# Patient Record
Sex: MALE | Race: WHITE | Employment: OTHER | URBAN - METROPOLITAN AREA
[De-identification: names, ages, dates, MRNs, and addresses within clinical notes are randomized per-mention and may not be internally consistent; named-entity substitution may affect disease eponyms.]

---

## 2017-11-01 ENCOUNTER — HOSPITAL ENCOUNTER (OUTPATIENT)
Dept: PREADMISSION TESTING | Age: 72
Discharge: HOME | End: 2017-11-01
Payer: MEDICARE

## 2017-11-01 DIAGNOSIS — Z01.811 PRE-OP CHEST EXAM: ICD-10-CM

## 2017-11-01 LAB
ANION GAP SERPL CALC-SCNC: 9 MMOL/L (ref 3–18)
APTT PPP: 28.5 SEC (ref 23–36.4)
BACTERIA SPEC CULT: NORMAL
BUN SERPL-MCNC: 20 MG/DL (ref 7–18)
BUN/CREAT SERPL: 24 (ref 12–20)
CALCIUM SERPL-MCNC: 9.1 MG/DL (ref 8.5–10.1)
CHLORIDE SERPL-SCNC: 106 MMOL/L (ref 100–108)
CO2 SERPL-SCNC: 28 MMOL/L (ref 21–32)
CREAT SERPL-MCNC: 0.84 MG/DL (ref 0.6–1.3)
ERYTHROCYTE [DISTWIDTH] IN BLOOD BY AUTOMATED COUNT: 15.7 % (ref 11.6–14.5)
GLUCOSE SERPL-MCNC: 98 MG/DL (ref 74–99)
HCT VFR BLD AUTO: 40.1 % (ref 36–48)
HGB BLD-MCNC: 13.2 G/DL (ref 13–16)
INR PPP: 1.1 (ref 0.8–1.2)
MCH RBC QN AUTO: 28 PG (ref 24–34)
MCHC RBC AUTO-ENTMCNC: 32.9 G/DL (ref 31–37)
MCV RBC AUTO: 85 FL (ref 74–97)
PLATELET # BLD AUTO: 156 K/UL (ref 135–420)
PMV BLD AUTO: 10.5 FL (ref 9.2–11.8)
POTASSIUM SERPL-SCNC: 4 MMOL/L (ref 3.5–5.5)
PROTHROMBIN TIME: 13.6 SEC (ref 11.5–15.2)
RBC # BLD AUTO: 4.72 M/UL (ref 4.7–5.5)
SERVICE CMNT-IMP: NORMAL
SODIUM SERPL-SCNC: 143 MMOL/L (ref 136–145)
WBC # BLD AUTO: 6.6 K/UL (ref 4.6–13.2)

## 2017-11-01 PROCEDURE — 87641 MR-STAPH DNA AMP PROBE: CPT | Performed by: ORTHOPAEDIC SURGERY

## 2017-11-01 PROCEDURE — 85730 THROMBOPLASTIN TIME PARTIAL: CPT | Performed by: ORTHOPAEDIC SURGERY

## 2017-11-01 PROCEDURE — 80048 BASIC METABOLIC PNL TOTAL CA: CPT | Performed by: ORTHOPAEDIC SURGERY

## 2017-11-01 PROCEDURE — 85610 PROTHROMBIN TIME: CPT | Performed by: ORTHOPAEDIC SURGERY

## 2017-11-01 PROCEDURE — 93005 ELECTROCARDIOGRAM TRACING: CPT

## 2017-11-01 PROCEDURE — 85027 COMPLETE CBC AUTOMATED: CPT | Performed by: ORTHOPAEDIC SURGERY

## 2017-11-01 PROCEDURE — 36415 COLL VENOUS BLD VENIPUNCTURE: CPT | Performed by: ORTHOPAEDIC SURGERY

## 2017-11-02 LAB
ATRIAL RATE: 59 BPM
CALCULATED P AXIS, ECG09: 66 DEGREES
CALCULATED R AXIS, ECG10: 61 DEGREES
CALCULATED T AXIS, ECG11: 71 DEGREES
DIAGNOSIS, 93000: NORMAL
P-R INTERVAL, ECG05: 166 MS
Q-T INTERVAL, ECG07: 422 MS
QRS DURATION, ECG06: 102 MS
QTC CALCULATION (BEZET), ECG08: 417 MS
VENTRICULAR RATE, ECG03: 59 BPM

## 2017-11-19 PROBLEM — M54.10 RADICULOPATHY OF LEG: Status: ACTIVE | Noted: 2017-11-19

## 2017-11-19 PROBLEM — M48.061 LUMBAR SPINAL STENOSIS: Status: ACTIVE | Noted: 2017-11-19

## 2017-11-19 PROBLEM — M47.816 LUMBAR SPONDYLOSIS: Status: ACTIVE | Noted: 2017-11-19

## 2017-11-20 ENCOUNTER — HOSPITAL ENCOUNTER (OUTPATIENT)
Age: 72
Setting detail: OUTPATIENT SURGERY
Discharge: HOME | End: 2017-11-20
Attending: ORTHOPAEDIC SURGERY | Admitting: ORTHOPAEDIC SURGERY
Payer: MEDICARE

## 2017-11-20 ENCOUNTER — ANESTHESIA EVENT (OUTPATIENT)
Dept: SURGERY | Age: 72
End: 2017-11-20
Payer: MEDICARE

## 2017-11-20 ENCOUNTER — ANESTHESIA (OUTPATIENT)
Dept: SURGERY | Age: 72
End: 2017-11-20
Payer: MEDICARE

## 2017-11-20 ENCOUNTER — APPOINTMENT (OUTPATIENT)
Dept: GENERAL RADIOLOGY | Age: 72
End: 2017-11-20
Attending: ORTHOPAEDIC SURGERY
Payer: MEDICARE

## 2017-11-20 VITALS
TEMPERATURE: 97.9 F | HEART RATE: 74 BPM | DIASTOLIC BLOOD PRESSURE: 69 MMHG | SYSTOLIC BLOOD PRESSURE: 115 MMHG | HEIGHT: 69 IN | RESPIRATION RATE: 18 BRPM | BODY MASS INDEX: 21.2 KG/M2 | WEIGHT: 143.13 LBS | OXYGEN SATURATION: 98 %

## 2017-11-20 PROCEDURE — 76060000033 HC ANESTHESIA 1 TO 1.5 HR: Performed by: ORTHOPAEDIC SURGERY

## 2017-11-20 PROCEDURE — 77030003666 HC NDL SPINAL BD -A: Performed by: ORTHOPAEDIC SURGERY

## 2017-11-20 PROCEDURE — 74011250636 HC RX REV CODE- 250/636

## 2017-11-20 PROCEDURE — 74011250636 HC RX REV CODE- 250/636: Performed by: ANESTHESIOLOGY

## 2017-11-20 PROCEDURE — 77030032490 HC SLV COMPR SCD KNE COVD -B: Performed by: ORTHOPAEDIC SURGERY

## 2017-11-20 PROCEDURE — 77030008683 HC TU ET CUF COVD -A: Performed by: ANESTHESIOLOGY

## 2017-11-20 PROCEDURE — 77030006643: Performed by: ANESTHESIOLOGY

## 2017-11-20 PROCEDURE — 76010000149 HC OR TIME 1 TO 1.5 HR: Performed by: ORTHOPAEDIC SURGERY

## 2017-11-20 PROCEDURE — 77030033138 HC SUT PGA STRATFX J&J -B: Performed by: ORTHOPAEDIC SURGERY

## 2017-11-20 PROCEDURE — 77030018836 HC SOL IRR NACL ICUM -A: Performed by: ORTHOPAEDIC SURGERY

## 2017-11-20 PROCEDURE — 77030011640 HC PAD GRND REM COVD -A: Performed by: ORTHOPAEDIC SURGERY

## 2017-11-20 PROCEDURE — 77030004435 HC BUR RND STRY -C: Performed by: ORTHOPAEDIC SURGERY

## 2017-11-20 PROCEDURE — 74011000250 HC RX REV CODE- 250

## 2017-11-20 PROCEDURE — 77030013708 HC HNDPC SUC IRR PULS STRY –B: Performed by: ORTHOPAEDIC SURGERY

## 2017-11-20 PROCEDURE — 74011250636 HC RX REV CODE- 250/636: Performed by: ORTHOPAEDIC SURGERY

## 2017-11-20 PROCEDURE — 77030008477 HC STYL SATN SLP COVD -A: Performed by: ANESTHESIOLOGY

## 2017-11-20 PROCEDURE — 77030003029 HC SUT VCRL J&J -B: Performed by: ORTHOPAEDIC SURGERY

## 2017-11-20 PROCEDURE — 76210000021 HC REC RM PH II 0.5 TO 1 HR: Performed by: ORTHOPAEDIC SURGERY

## 2017-11-20 PROCEDURE — 76210000016 HC OR PH I REC 1 TO 1.5 HR: Performed by: ORTHOPAEDIC SURGERY

## 2017-11-20 PROCEDURE — 77030013079 HC BLNKT BAIR HGGR 3M -A: Performed by: ANESTHESIOLOGY

## 2017-11-20 PROCEDURE — 77030020010 HC FCPS BPLR DISP INLC -E: Performed by: ORTHOPAEDIC SURGERY

## 2017-11-20 PROCEDURE — 77030020782 HC GWN BAIR PAWS FLX 3M -B: Performed by: ORTHOPAEDIC SURGERY

## 2017-11-20 PROCEDURE — 77030037875 HC DRSG MEPILEX <16IN BORD MOLN -A: Performed by: ORTHOPAEDIC SURGERY

## 2017-11-20 PROCEDURE — 77030034479 HC ADH SKN CLSR PRINEO J&J -B: Performed by: ORTHOPAEDIC SURGERY

## 2017-11-20 PROCEDURE — 74011000250 HC RX REV CODE- 250: Performed by: ORTHOPAEDIC SURGERY

## 2017-11-20 PROCEDURE — 77030020255 HC SOL INJ LR 1000ML BG: Performed by: ORTHOPAEDIC SURGERY

## 2017-11-20 RX ORDER — INSULIN LISPRO 100 [IU]/ML
INJECTION, SOLUTION INTRAVENOUS; SUBCUTANEOUS ONCE
Status: DISCONTINUED | OUTPATIENT
Start: 2017-11-20 | End: 2017-11-20 | Stop reason: HOSPADM

## 2017-11-20 RX ORDER — SODIUM CHLORIDE, SODIUM LACTATE, POTASSIUM CHLORIDE, CALCIUM CHLORIDE 600; 310; 30; 20 MG/100ML; MG/100ML; MG/100ML; MG/100ML
125 INJECTION, SOLUTION INTRAVENOUS CONTINUOUS
Status: DISCONTINUED | OUTPATIENT
Start: 2017-11-20 | End: 2017-11-20 | Stop reason: HOSPADM

## 2017-11-20 RX ORDER — HYDROMORPHONE HYDROCHLORIDE 2 MG/ML
INJECTION, SOLUTION INTRAMUSCULAR; INTRAVENOUS; SUBCUTANEOUS AS NEEDED
Status: DISCONTINUED | OUTPATIENT
Start: 2017-11-20 | End: 2017-11-20 | Stop reason: HOSPADM

## 2017-11-20 RX ORDER — ACETAMINOPHEN 10 MG/ML
1000 INJECTION, SOLUTION INTRAVENOUS ONCE
Status: DISCONTINUED | OUTPATIENT
Start: 2017-11-20 | End: 2017-11-20 | Stop reason: HOSPADM

## 2017-11-20 RX ORDER — HYDROMORPHONE HYDROCHLORIDE 1 MG/ML
0.5 INJECTION, SOLUTION INTRAMUSCULAR; INTRAVENOUS; SUBCUTANEOUS
Status: DISCONTINUED | OUTPATIENT
Start: 2017-11-20 | End: 2017-11-20 | Stop reason: HOSPADM

## 2017-11-20 RX ORDER — SODIUM CHLORIDE 0.9 % (FLUSH) 0.9 %
5-10 SYRINGE (ML) INJECTION AS NEEDED
Status: DISCONTINUED | OUTPATIENT
Start: 2017-11-20 | End: 2017-11-20 | Stop reason: HOSPADM

## 2017-11-20 RX ORDER — ACETAMINOPHEN AND CODEINE PHOSPHATE 300; 30 MG/1; MG/1
2 TABLET ORAL
COMMUNITY
End: 2017-11-20

## 2017-11-20 RX ORDER — CIPROFLOXACIN 750 MG/1
750 TABLET, FILM COATED ORAL EVERY 12 HOURS
Qty: 14 TAB | Refills: 0 | Status: SHIPPED | OUTPATIENT
Start: 2017-11-20 | End: 2017-11-27

## 2017-11-20 RX ORDER — ONDANSETRON 2 MG/ML
INJECTION INTRAMUSCULAR; INTRAVENOUS AS NEEDED
Status: DISCONTINUED | OUTPATIENT
Start: 2017-11-20 | End: 2017-11-20 | Stop reason: HOSPADM

## 2017-11-20 RX ORDER — MIDAZOLAM HYDROCHLORIDE 1 MG/ML
INJECTION, SOLUTION INTRAMUSCULAR; INTRAVENOUS AS NEEDED
Status: DISCONTINUED | OUTPATIENT
Start: 2017-11-20 | End: 2017-11-20 | Stop reason: HOSPADM

## 2017-11-20 RX ORDER — FENTANYL CITRATE 50 UG/ML
INJECTION, SOLUTION INTRAMUSCULAR; INTRAVENOUS AS NEEDED
Status: DISCONTINUED | OUTPATIENT
Start: 2017-11-20 | End: 2017-11-20 | Stop reason: HOSPADM

## 2017-11-20 RX ORDER — FLUMAZENIL 0.1 MG/ML
0.2 INJECTION INTRAVENOUS
Status: DISCONTINUED | OUTPATIENT
Start: 2017-11-20 | End: 2017-11-20 | Stop reason: HOSPADM

## 2017-11-20 RX ORDER — NEOSTIGMINE METHYLSULFATE 1 MG/ML
INJECTION INTRAVENOUS AS NEEDED
Status: DISCONTINUED | OUTPATIENT
Start: 2017-11-20 | End: 2017-11-20 | Stop reason: HOSPADM

## 2017-11-20 RX ORDER — CYCLOBENZAPRINE HCL 10 MG
5 TABLET ORAL
Qty: 30 TAB | Refills: 1 | Status: SHIPPED | OUTPATIENT
Start: 2017-11-20 | End: 2020-01-14

## 2017-11-20 RX ORDER — MAGNESIUM SULFATE 100 %
4 CRYSTALS MISCELLANEOUS AS NEEDED
Status: DISCONTINUED | OUTPATIENT
Start: 2017-11-20 | End: 2017-11-20 | Stop reason: HOSPADM

## 2017-11-20 RX ORDER — ROCURONIUM BROMIDE 10 MG/ML
INJECTION, SOLUTION INTRAVENOUS AS NEEDED
Status: DISCONTINUED | OUTPATIENT
Start: 2017-11-20 | End: 2017-11-20 | Stop reason: HOSPADM

## 2017-11-20 RX ORDER — OXYCODONE AND ACETAMINOPHEN 5; 325 MG/1; MG/1
1 TABLET ORAL
Qty: 40 TAB | Refills: 0 | Status: SHIPPED | OUTPATIENT
Start: 2017-11-20 | End: 2020-01-14

## 2017-11-20 RX ORDER — DEXAMETHASONE SODIUM PHOSPHATE 4 MG/ML
INJECTION, SOLUTION INTRA-ARTICULAR; INTRALESIONAL; INTRAMUSCULAR; INTRAVENOUS; SOFT TISSUE AS NEEDED
Status: DISCONTINUED | OUTPATIENT
Start: 2017-11-20 | End: 2017-11-20 | Stop reason: HOSPADM

## 2017-11-20 RX ORDER — DEXTROSE 50 % IN WATER (D50W) INTRAVENOUS SYRINGE
25-50 AS NEEDED
Status: DISCONTINUED | OUTPATIENT
Start: 2017-11-20 | End: 2017-11-20 | Stop reason: HOSPADM

## 2017-11-20 RX ORDER — GLYCOPYRROLATE 0.2 MG/ML
INJECTION INTRAMUSCULAR; INTRAVENOUS AS NEEDED
Status: DISCONTINUED | OUTPATIENT
Start: 2017-11-20 | End: 2017-11-20 | Stop reason: HOSPADM

## 2017-11-20 RX ORDER — CEFAZOLIN SODIUM 2 G/50ML
2 SOLUTION INTRAVENOUS ONCE
Status: COMPLETED | OUTPATIENT
Start: 2017-11-20 | End: 2017-11-20

## 2017-11-20 RX ORDER — LIDOCAINE HYDROCHLORIDE 20 MG/ML
INJECTION, SOLUTION EPIDURAL; INFILTRATION; INTRACAUDAL; PERINEURAL AS NEEDED
Status: DISCONTINUED | OUTPATIENT
Start: 2017-11-20 | End: 2017-11-20 | Stop reason: HOSPADM

## 2017-11-20 RX ORDER — EPHEDRINE SULFATE/0.9% NACL/PF 25 MG/5 ML
SYRINGE (ML) INTRAVENOUS AS NEEDED
Status: DISCONTINUED | OUTPATIENT
Start: 2017-11-20 | End: 2017-11-20 | Stop reason: HOSPADM

## 2017-11-20 RX ORDER — PROPOFOL 10 MG/ML
INJECTION, EMULSION INTRAVENOUS AS NEEDED
Status: DISCONTINUED | OUTPATIENT
Start: 2017-11-20 | End: 2017-11-20 | Stop reason: HOSPADM

## 2017-11-20 RX ORDER — NALOXONE HYDROCHLORIDE 0.4 MG/ML
0.1 INJECTION, SOLUTION INTRAMUSCULAR; INTRAVENOUS; SUBCUTANEOUS AS NEEDED
Status: DISCONTINUED | OUTPATIENT
Start: 2017-11-20 | End: 2017-11-20 | Stop reason: HOSPADM

## 2017-11-20 RX ORDER — SODIUM CHLORIDE, SODIUM LACTATE, POTASSIUM CHLORIDE, CALCIUM CHLORIDE 600; 310; 30; 20 MG/100ML; MG/100ML; MG/100ML; MG/100ML
1000 INJECTION, SOLUTION INTRAVENOUS CONTINUOUS
Status: DISCONTINUED | OUTPATIENT
Start: 2017-11-20 | End: 2017-11-20 | Stop reason: HOSPADM

## 2017-11-20 RX ADMIN — ONDANSETRON 4 MG: 2 INJECTION INTRAMUSCULAR; INTRAVENOUS at 12:06

## 2017-11-20 RX ADMIN — Medication 10 MG: at 12:33

## 2017-11-20 RX ADMIN — GLYCOPYRROLATE 0.4 MG: 0.2 INJECTION INTRAMUSCULAR; INTRAVENOUS at 13:01

## 2017-11-20 RX ADMIN — NEOSTIGMINE METHYLSULFATE 3 MG: 1 INJECTION INTRAVENOUS at 13:01

## 2017-11-20 RX ADMIN — ROCURONIUM BROMIDE 30 MG: 10 INJECTION, SOLUTION INTRAVENOUS at 11:56

## 2017-11-20 RX ADMIN — ROCURONIUM BROMIDE 20 MG: 10 INJECTION, SOLUTION INTRAVENOUS at 12:16

## 2017-11-20 RX ADMIN — LIDOCAINE HYDROCHLORIDE 60 MG: 20 INJECTION, SOLUTION EPIDURAL; INFILTRATION; INTRACAUDAL; PERINEURAL at 11:56

## 2017-11-20 RX ADMIN — SODIUM CHLORIDE, SODIUM LACTATE, POTASSIUM CHLORIDE, AND CALCIUM CHLORIDE 125 ML/HR: 600; 310; 30; 20 INJECTION, SOLUTION INTRAVENOUS at 08:44

## 2017-11-20 RX ADMIN — MIDAZOLAM HYDROCHLORIDE 2 MG: 1 INJECTION, SOLUTION INTRAMUSCULAR; INTRAVENOUS at 11:52

## 2017-11-20 RX ADMIN — HYDROMORPHONE HYDROCHLORIDE 0.5 MG: 2 INJECTION, SOLUTION INTRAMUSCULAR; INTRAVENOUS; SUBCUTANEOUS at 12:50

## 2017-11-20 RX ADMIN — SODIUM CHLORIDE, SODIUM LACTATE, POTASSIUM CHLORIDE, AND CALCIUM CHLORIDE: 600; 310; 30; 20 INJECTION, SOLUTION INTRAVENOUS at 13:05

## 2017-11-20 RX ADMIN — ACETAMINOPHEN 1000 MG: 10 INJECTION, SOLUTION INTRAVENOUS at 13:58

## 2017-11-20 RX ADMIN — DEXAMETHASONE SODIUM PHOSPHATE 4 MG: 4 INJECTION, SOLUTION INTRA-ARTICULAR; INTRALESIONAL; INTRAMUSCULAR; INTRAVENOUS; SOFT TISSUE at 12:06

## 2017-11-20 RX ADMIN — FENTANYL CITRATE 100 MCG: 50 INJECTION, SOLUTION INTRAMUSCULAR; INTRAVENOUS at 11:52

## 2017-11-20 RX ADMIN — CEFAZOLIN SODIUM 2 G: 2 SOLUTION INTRAVENOUS at 12:06

## 2017-11-20 RX ADMIN — PROPOFOL 80 MG: 10 INJECTION, EMULSION INTRAVENOUS at 12:16

## 2017-11-20 RX ADMIN — SODIUM CHLORIDE, SODIUM LACTATE, POTASSIUM CHLORIDE, AND CALCIUM CHLORIDE: 600; 310; 30; 20 INJECTION, SOLUTION INTRAVENOUS at 12:17

## 2017-11-20 RX ADMIN — PROPOFOL 120 MG: 10 INJECTION, EMULSION INTRAVENOUS at 11:56

## 2017-11-20 RX ADMIN — LIDOCAINE HYDROCHLORIDE 40 MG: 20 INJECTION, SOLUTION EPIDURAL; INFILTRATION; INTRACAUDAL; PERINEURAL at 12:15

## 2017-11-20 RX ADMIN — HYDROMORPHONE HYDROCHLORIDE 0.5 MG: 2 INJECTION, SOLUTION INTRAMUSCULAR; INTRAVENOUS; SUBCUTANEOUS at 13:08

## 2017-11-20 NOTE — ANESTHESIA POSTPROCEDURE EVALUATION
Post-Anesthesia Evaluation & Assessment    Visit Vitals    /60    Pulse 65    Temp 36.8 °C (98.2 °F)    Resp 12    Ht 5' 9\" (1.753 m)    Wt 64.9 kg (143 lb 2 oz)    SpO2 95%    BMI 21.14 kg/m2       No untreated/active PONV    Post-operative hydration adequate. Adequate post-operative analgesia per PACU discharge criteria    Mental status & level of consciousness: alert and oriented x 3    Respiratory status: patent unassisted airway     No apparent anesthetic complications requiring additional post-anesthetic care    Patient has met all discharge requirements.             Raymon Zuniga MD

## 2017-11-20 NOTE — INTERVAL H&P NOTE
H&P Update:  Juan Diego [place] was seen and examined. History and physical has been reviewed. The patient has been examined. There have been no significant clinical changes since the completion of the originally dated History and Physical.  Patient identified by surgeon; surgical site was confirmed by patient and surgeon.     Signed By: Demetrio Hernandez MD     November 20, 2017 7:33 AM

## 2017-11-20 NOTE — ANESTHESIA PREPROCEDURE EVALUATION
Anesthetic History   No history of anesthetic complications            Review of Systems / Medical History  Patient summary reviewed, nursing notes reviewed and pertinent labs reviewed    Pulmonary  Within defined limits                 Neuro/Psych         Neuromuscular disease     Cardiovascular    Hypertension: well controlled              Exercise tolerance: >4 METS     GI/Hepatic/Renal  Within defined limits              Endo/Other  Within defined limits           Other Findings            Physical Exam    Airway  Mallampati: II  TM Distance: 4 - 6 cm  Neck ROM: normal range of motion   Mouth opening: Normal     Cardiovascular    Rhythm: regular  Rate: normal         Dental    Dentition: Caps/crowns     Pulmonary  Breath sounds clear to auscultation               Abdominal  GI exam deferred       Other Findings            Anesthetic Plan    ASA: 3  Anesthesia type: general          Induction: Intravenous  Anesthetic plan and risks discussed with: Patient

## 2017-11-20 NOTE — PERIOP NOTES
AVS med list reviewed and verified - no duplicates with meds - common med side effects handout to pt - discharge instructions completed - opportunity for questions

## 2017-11-20 NOTE — H&P
History and Physical        Patient: Jennifer Dunaway               Sex: male          DOA: (Not on file)         YOB: 1945      Age:  81 y.o.        LOS:  LOS: 0 days        HPI:     Jennifer Dunaway is a 81 y.o. male who has a history of back and lower extremity pain. Their pain is typically across the lower back and travels into the left lower extremity down the lateral, posterior aspect of the leg. He has numbness/tingling in the same distribution of their pain. He denies weakness in the bilateral lower extremity. Their pain is worse with ambulation and better at rest. He has failed conservative care including anti-inflammatories, analgesics, physical therapy and injections. Their pain affects their activities of daily living and would like to move forward with surgical intervention. Past Medical History:   Diagnosis Date    Arthritis     Cancer ([facility] 1999    melanoma    Hypertension     Parkinson disease ([facility]        Past Surgical History:   Procedure Laterality Date    HX CORNEAL TRANSPLANT Bilateral     HX OTHER SURGICAL  1999    Exc melanoma on back    HX TONSILLECTOMY         No family history on file. Social History     Social History    Marital status:      Spouse name: N/A    Number of children: N/A    Years of education: N/A     Social History Main Topics    Smoking status: Never Smoker    Smokeless tobacco: Never Used    Alcohol use Yes      Comment: 1 -2 beers or glass wine per year    Drug use: No    Sexual activity: No     Other Topics Concern    Not on file     Social History Narrative    No narrative on file       Prior to Admission medications    Medication Sig Start Date End Date Taking? Authorizing Provider   amLODIPine (NORVASC) 10 mg tablet Take 10 mg by mouth daily. Historical Provider   doxycycline (MONODOX) 100 mg capsule Take 100 mg by mouth nightly.     Historical Provider   carbidopa-levodopa ER (SINEMET CR)  mg per tablet Take 1.5 Tabs by mouth four (4) times daily. Historical Provider   Pramipexole (MIRAPEX ER) 1.5 mg Tb24 Take  by mouth daily. Historical Provider   FOLIC ACID/MULTIVIT-MIN/LUTEIN (CENTRUM SILVER PO) Take 1 Tab by mouth daily. Historical Provider   ascorbic acid, vitamin C, (VITAMIN C) 500 mg tablet Take 1,000 mg by mouth daily. Historical Provider   CALCIUM CITRATE/VITAMIN D3 (CALCIUM CITRATE + D PO) Take 1 Tab by mouth daily. Historical Provider   docusate sodium (DOK) 250 mg capsule Take 250 mg by mouth daily. Historical Provider   oxyCODONE-acetaminophen (PERCOCET 10)  mg per tablet Take 1 Tab by mouth every four (4) hours as needed for Pain. Historical Provider   acetaminophen (TYLENOL EXTRA STRENGTH) 500 mg tablet Take 1,000 mg by mouth every six (6) hours as needed for Pain. Historical Provider       No Known Allergies    Review of Systems  A comprehensive review of systems was negative except for that written in the History of Present Illness. Physical Exam:      There were no vitals taken for this visit. Physical Exam:  General: Alert and Oriented X 3  Lungs: Clear to ausculation bilaterally  Cardiovascular: Regular Rate and Rhythm, without murmur  Abdomen: Soft, nontender with positive bowel sounds in all four quadrants  Gential/Rectal: deferred  Musculoskeletal: The paitent has full range of motion of the lumbar spine in flexion, extension and side bending bilaterally. The patient has pain with flexion or extension. There is not pain with internal and external rotation of the bilaterally hip. The patient is tender across the left paralumbar, right paralumbar muscles. The patient is neurologically intact in the lower extremities. There is a Negative straight leg raise. His pulses are 2+. Calves are nontender. Radiographic evaluation show Lumbar DDD at L4-S1      Labs Reviewed: All lab results for the last 24 hours reviewed.     Assessment/Plan     Principal Problem:    Lumbar spinal stenosis (11/19/2017)    Active Problems:    Radiculopathy of leg (11/19/2017)      Lumbar spondylosis (11/19/2017)        We are going to move forward with a left decompressive lumbar hemilaminectomy at L4-S1. The risks vs the benefits have been discussed with the patient. They will follow-up with us in the hospital 10 days post-operatively and call with any and all concerns.

## 2017-11-20 NOTE — IP AVS SNAPSHOT
Jeffrey Esteves 
 
 
 [address] [address] 
[phone] Patient: Chandana Choi MRN: [id number] About your hospitalization You were admitted on:  November 20, 2017 You last received care in the:  [facility] PACU You were discharged on:  November 20, 2017 Why you were hospitalized Your primary diagnosis was:  Lumbar Spinal Stenosis Your diagnoses also included:  Radiculopathy Of Leg, Lumbar Spondylosis Things You Need To Do (next 8 weeks) Follow up with Sumanth Powell MD  
  
Phone:  [phone] Where:  [address] [facility] [address] [facility], [place] [address], [address] Follow up with Gonzalo Garrison MD in 10 day(s) Phone:  [phone] Where:  [address], , Orthopedic and [address] [address], [facility] [address] Discharge Orders None A check boy indicates which time of day the medication should be taken. My Medications STOP taking these medications TYLENOL-CODEINE #3 300-30 mg per tablet Generic drug:  acetaminophen-codeine TAKE these medications as instructed Instructions Each Dose to Equal  
 Morning Noon Evening Bedtime CALCIUM CITRATE + D PO Your last dose was: Your next dose is: Take 1 Tab by mouth daily. 1 Tab  
    
   
   
   
  
 carbidopa-levodopa ER  mg per tablet Commonly known as:  SINEMET CR Your last dose was: Your next dose is: Take 1.5 Tabs by mouth four (4) times daily. 1.5 Tab CENTRUM SILVER PO Your last dose was: Your next dose is: Take 1 Tab by mouth daily. 1 Tab  
    
   
   
   
  
 ciprofloxacin HCl 750 mg tablet Commonly known as:  CIPRO Your last dose was: Your next dose is: Take 1 Tab by mouth every twelve (12) hours for 7 days.   
 750 mg  
    
   
   
   
  
 cyclobenzaprine 10 mg tablet Commonly known as:  FLEXERIL Your last dose was: Your next dose is: Take 0.5 Tabs by mouth three (3) times daily as needed for Muscle Spasm(s). 5 mg  
    
   
   
   
  
 docusate sodium 250 mg capsule Commonly known as:  [address] Your last dose was: Your next dose is: Take 250 mg by mouth daily. 250 mg  
    
   
   
   
  
 doxycycline 100 mg capsule Commonly known as:  Simone Villa Your last dose was: Your next dose is: Take 100 mg by mouth nightly. 100 mg MIRAPEX ER 1.5 mg Tb24 Generic drug:  Pramipexole Your last dose was: Your next dose is: Take  by mouth daily. NORVASC 10 mg tablet Generic drug:  amLODIPine Your last dose was: Your next dose is: Take 10 mg by mouth daily. 10 mg  
    
   
   
   
  
 oxyCODONE-acetaminophen 5-325 mg per tablet Commonly known as:  PERCOCET Your last dose was: Your next dose is: Take 1 Tab by mouth every six (6) hours as needed for Pain. Max Daily Amount: 4 Tabs. 1 Tab TYLENOL EXTRA STRENGTH 500 mg tablet Generic drug:  acetaminophen Your last dose was: Your next dose is: Take 1,000 mg by mouth every six (6) hours as needed for Pain. 1000 mg  
    
   
   
   
  
 VITAMIN C 500 mg tablet Generic drug:  ascorbic acid (vitamin C) Your last dose was: Your next dose is: Take 1,000 mg by mouth daily. 1000 mg Where to Get Your Medications Information on where to get these meds will be given to you by the nurse or doctor. ! Ask your nurse or doctor about these medications  
  ciprofloxacin HCl 750 mg tablet  
 cyclobenzaprine 10 mg tablet  
 oxyCODONE-acetaminophen 5-325 mg per tablet Discharge Instructions [address] Francisco Abreu Instructions Lumbar Laminectomy and Diskectomy ACTIVITIES : 
*The first week after surgery 1. You may be up and walking about the house. 2.  Activities around the house, such as washing dishes, fixing light meals, and your own personal care are fine. 3.  Avoid strenuous activities, such as vacuuming, lifting laundry or grocery bags. 4.  Do not lift anything heavier than 1 gallon of milk (or about 5-8 pounds). *Week 2 and beyond 1. You may gradually increase your activities, but still avoid heavy lifting, pushing/pulling. 2.  Walking is the best way to rebuild strength and stamina. Start SLOWLY and gradually increase the distance a little every week. 3.  Walk at a pace that avoids fatigue or severe pain. Do not try to walk several blocks the first day! As you increase the distance, you may feel tired. If so, stop and rest.  
4.  You should be able to walk several blocks by your first clinic visit. 5.  Follow-up with Dr. Saud Peter in 10-14 days. BATHING and INCISION CARE: 
1. The incision may be tender to touch or feel numb: this is normal.  
2.  Keep the incision clean and dry no showering until your follow-up appointment. The incision will be closed with sutures under the skin and the skin will be glued. 3.  Do not apply any lotions, ointments or oils on the incision. 4.  If you notice any excessive swelling, redness, or persistent drainage around the incision, notify the office immediately. DRIVIN. You should not drive until after your follow-up appointment. 2.  You can be in a vehicle for short distances, but if you travel any long distance, please stop about every 30 minutes and walk/stretch. 3.  You should NEVER drive while taking narcotic medication. RETURN TO WORK : 
1. The decision to return to work will be determined on an individual basis. 2.  Many people who have a strenuous job (construction, heavy labor, etc) may need to be off work for up to 4 weeks. 3.  If you need a work note, please let us know as soon as possible, and not the same day you are planning to return to work. NUTRITION : 
1.  Good nutrition is an essential part of healing. 2.  You should eat a balanced diet each day, including fruits, vegetables, dairy products and protein. 3.  Remember to drink plenty of water. 4.  If you have not had a bowel movement within 3 days of surgery, you will need to use a laxative or suppository that can be obtained over-the-counter at your local pharmacy. MEDICATIONS - 
1. You may resume the medications you were taking before surgery, with the general exception of (or DO NOT TAKE) non-steroidal anti-inflammatory medications, such as Motrin, Aleve, Advil Naprosyn, Ibuprofen or aspirin for 5 days after surgery. 2.  You will receive a prescription for pain medication at discharge from the hospital. The pain medication works best if taken before the pain becomes severe. 3.  To reduce stomach upset, always take the medication with food. 4.  Begin to wean yourself off the pain medication during the second week after discharge. 5.  If you need a refill, please call the office during working hours at least 2 days before your prescription runs out. Do not wait until your bottle is empty to call for a refill. 6.  DO NOT drive if you are taking narcotic pain medications. HOME HEALTH CARE: 
1.   A home health care service has been set-up for you to help assist you once you leave the hospital. 
2.  They will contact you either before you leave the hospital or within 24 hours once you have been discharged home. 3. A nurse will assist you with your dressing changes and a Physical Therapist with help you with your therapy needs. CALL THE OFFICE: 
?  If you have severe pain unrelieved by the medications, new numbness or tingling in your legs; 
? If you have a fever of 101.0°F or greater;  
? If you notice excessive swelling, redness, or persistent drainage from the incision or IV site; [facility] is [phone] from 8:00am to 5:00pm Monday through Friday. After 5:00pm, on weekends, or holidays, please leave a message with our answering service and the doctor on-call will get back to you shortly. DISCHARGE SUMMARY from Nurse PATIENT INSTRUCTIONS: 
 
 
F-face looks uneven A-arms unable to move or move unevenly S-speech slurred or non-existent T-time-call 911 as soon as signs and symptoms begin-DO NOT go Back to bed or wait to see if you get better-TIME IS BRAIN. Warning Signs of HEART ATTACK Call 911 if you have these symptoms: 
? Chest discomfort. Most heart attacks involve discomfort in the center of the chest that lasts more than a few minutes, or that goes away and comes back. It can feel like uncomfortable pressure, squeezing, fullness, or pain. ? Discomfort in other areas of the upper body. Symptoms can include pain or discomfort in one or both arms, the back, neck, jaw, or stomach. ? Shortness of breath with or without chest discomfort. ? Other signs may include breaking out in a cold sweat, nausea, or lightheadedness. Don't wait more than five minutes to call [address]! Fast action can save your life. Calling 911 is almost always the fastest way to get lifesaving treatment. Emergency Medical Services staff can begin treatment when they arrive  up to an hour sooner than if someone gets to the hospital by car. Patient armband removed and shredded The discharge information has been reviewed with the patient and caregiver. The patient and caregiver verbalized understanding. Discharge medications reviewed with the patient and caregiver and appropriate educational materials and side effects teaching were provided. ___________________________________________________________________________________________________________________________________ Introducing hospitals & HEALTH SERVICES! Minerva Drake introduces [employer] patient portal. Now you can access parts of your medical record, email your doctor's office, and request medication refills online. 1. In your internet browser, go to [email or web address] 2. Click on the First Time User? Click Here link in the Sign In box. You will see the New Member Sign Up page. 3. Enter your [employer] Access Code exactly as it appears below. You will not need to use this code after youve completed the sign-up process. If you do not sign up before the expiration date, you must request a new code. · [employer] Access Code: [email or web address] Expires: 1/30/2018 10:53 AM 
 
4. Enter the last four digits of your Social Security Number (xxxx) and Date of Birth (mm/dd/yyyy) as indicated and click Submit. You will be taken to the next sign-up page. 5. Create a [employer] ID. This will be your [employer] login ID and cannot be changed, so think of one that is secure and easy to remember. 6. Create a [employer] password. You can change your password at any time. 7. Enter your Password Reset Question and Answer. This can be used at a later time if you forget your password. 8. Enter your e-mail address. You will receive e-mail notification when new information is available in [address] 9. Click Sign Up. You can now view and download portions of your medical record. 10. Click the Download Summary menu link to download a portable copy of your medical information. If you have questions, please visit the Frequently Asked Questions section of the [employer] website. Remember, [employer] is NOT to be used for urgent needs. For medical emergencies, dial 911. Now available from your iPhone and Android! Unresulted Labs-Please follow up with your PCP about these lab tests Order Current Status [place] [facility] TECHNOLOGIST SERVICE In process Providers Seen During Your Hospitalization Provider Specialty Primary office phone Aundrea Artis MD Orthopedic Surgery [phone] Your Primary Care Physician (PCP) Primary Care Physician Office Phone Office Fax [address] [facility] [phone] You are allergic to the following No active allergies Recent Documentation Height Weight BMI Smoking Status 1.753 m 64.9 kg 21.14 kg/m2 Never Smoker Emergency Contacts Name Discharge Info Relation Home Work Mobile [facility] [place] DISCHARGE CAREGIVER [3] Spouse [3] [phone] Patient Belongings The following personal items are in your possession at time of discharge: 
  Dental Appliances: None  Visual Aid: Glasses      Home Medications: None   Jewelry: None  Clothing: Undergarments, Footwear, Socks, Shirt, Pants (locker #10)    Other Valuables: Brace, Wallet, Eyeglasses (locker #10; glasses wtih Yaya Phelan)) Please provide this summary of care documentation to your next provider. Signatures-by signing, you are acknowledging that this After Visit Summary has been reviewed with you and you have received a copy. Patient Signature:  ____________________________________________________________ Date:  ____________________________________________________________  
  
Demi Ferrari Provider Signature:  ____________________________________________________________ Date:  ____________________________________________________________

## 2017-11-20 NOTE — OP NOTES
OPERATIVE NOTE    Patient: Jacqueline Arellano MRN: [id number]  SSN: [id number]    YOB: 1945  Age: 81 y.o. Sex: male      Indications: This is a 81y.o. year-old male who presents with back and left leg pain. He was positive for stenosis/ddd. The patient was admitted for surgery as conservative measures have failed. Date of Procedure: 11/20/2017     Preoperative Diagnosis: LUMBAR SPONDYLOSIS WITH RADICULOPATHY,LUMBAR STENOSIS,LUMBOSACRAL STENOSIS    Postoperative Diagnosis: LUMBAR SPONDYLOSIS WITH RADICULOPATHY,LUMBAR STENOSIS,LUMBOSACRAL STENOSIS      Procedure: Procedure(s):  L4-S1 LEFT HEMILAMINECTOMY DISCECTOMY W/C-ARM **SPEC POP**    Surgeon: Alan Joshua DO,Surgical Assistant: Lilly Hernandez PA-C    Anesthesia: @ORANEST    Estimated Blood Loss: 100cc    Specimens: * No specimens in log *     Drains: none    Implants: * No implants in log *    Complications: None; patient tolerated the procedure well. Procedure: The patent was greeted by anesthesia and taken to the operative suite where the patient underwent general endotracheal anesthesia. The patient was positioned in the prone position on a standard radiolucent Maurice spine table. A davis catheter was placed. The lumbar spine was sterilely prepped and draped in the standard fashion. Flouroscopy confirmed the appropriate segments and an incision was made over the posterior spinous processes. The paraspinal musculature was elevated with electrocautery. Complete or partial spinous process resections were performed at  L-4-S1. A high speed bur was utilized to outline the laminectomy site and a complete decompressive lumbar laminectomy was subsequently performed secondary to spinal stenosis. Complete or partial laminectomy was performed at  L-4-S left. .  Aggressive medial facetectomies were performed at all levels with partial or complete facetectomies as needed to decompress the exiting spinal nerve roots.  A Foraminotomy was performed over each exiting nerve root to assure neurologic decompression. The pars were left intact to prevent post-laminectomy instability. At the completion of the decompression there was no evidence of residual neurological encroachment at any level. The tissues were irrigated with 1000 ml of sterile saline with bacitracin utilizing pulsatile lavage. All bleeding was cauterized with bipolar electrocautery or gel-foam hemostatic agents. A deep hemovac drain was placed. The fascia was re-approximated with 1-0 Vicryl, the skin edges were re-approximated with 2-0 Vicryl and the skin was closed with a running 3-0 Monocryl. A layer of Dermabond Prineo skin sealant was placed as well as a Mepalex dressing. The patient recovered from anesthesia and was transferred to the post-anesthesia care unit in stable condition.   Ana Tam MD  11/20/2017  3:03 PM

## 2017-11-20 NOTE — DISCHARGE INSTRUCTIONS
[facility]  Dr. Michele Bernal Post-Operative Instructions Lumbar Laminectomy and Diskectomy    ACTIVITIES :  *The first week after surgery   1. You may be up and walking about the house. 2.  Activities around the house, such as washing dishes, fixing light meals, and your own personal care are fine. 3.  Avoid strenuous activities, such as vacuuming, lifting laundry or grocery bags. 4.  Do not lift anything heavier than 1 gallon of milk (or about 5-8 pounds). *Week 2 and beyond  1. You may gradually increase your activities, but still avoid heavy lifting, pushing/pulling. 2.  Walking is the best way to rebuild strength and stamina. Start SLOWLY and gradually increase the distance a little every week. 3.  Walk at a pace that avoids fatigue or severe pain. Do not try to walk several blocks the first day! As you increase the distance, you may feel tired. If so, stop and rest.   4.  You should be able to walk several blocks by your first clinic visit. 5.  Follow-up with Dr. Juan Urbano in 10-14 days. BATHING and INCISION CARE:  1. The incision may be tender to touch or feel numb: this is normal.   2.  Keep the incision clean and dry no showering until your follow-up appointment. The incision will be closed with sutures under the skin and the skin will be glued. 3.  Do not apply any lotions, ointments or oils on the incision. 4.  If you notice any excessive swelling, redness, or persistent drainage around the incision, notify the office immediately. DRIVIN. You should not drive until after your follow-up appointment. 2.  You can be in a vehicle for short distances, but if you travel any long distance, please stop about every 30 minutes and walk/stretch. 3.  You should NEVER drive while taking narcotic medication. RETURN TO WORK :  1. The decision to return to work will be determined on an individual basis.    2.  Many people who have a strenuous job (construction, heavy labor, etc) may need to be off work for up to 4 weeks. 3.  If you need a work note, please let us know as soon as possible, and not the same day you are planning to return to work. NUTRITION :  1.  Good nutrition is an essential part of healing. 2.  You should eat a balanced diet each day, including fruits, vegetables, dairy products and protein. 3.  Remember to drink plenty of water. 4.  If you have not had a bowel movement within 3 days of surgery, you will need to use a laxative or suppository that can be obtained over-the-counter at your local pharmacy. MEDICATIONS -  1. You may resume the medications you were taking before surgery, with the general exception of (or DO NOT TAKE) non-steroidal anti-inflammatory medications, such as Motrin, Aleve, Advil Naprosyn, Ibuprofen or aspirin for 5 days after surgery. 2.  You will receive a prescription for pain medication at discharge from the hospital. The pain medication works best if taken before the pain becomes severe. 3.  To reduce stomach upset, always take the medication with food. 4.  Begin to wean yourself off the pain medication during the second week after discharge. 5.  If you need a refill, please call the office during working hours at least 2 days before your prescription runs out. Do not wait until your bottle is empty to call for a refill. 6.  DO NOT drive if you are taking narcotic pain medications. HOME HEALTH CARE:  1.   A home health care service has been set-up for you to help assist you once you leave the hospital.  2.  They will contact you either before you leave the hospital or within 24 hours once you have been discharged home. 3. A nurse will assist you with your dressing changes and a Physical Therapist with help you with your therapy needs.     CALL THE OFFICE:   If you have severe pain unrelieved by the medications, new numbness or tingling in your legs;   If you have a fever of 101.0°F or greater;    If you notice excessive swelling, redness, or persistent drainage from the incision or IV site; The [facility] office number is [phone] from 8:00am to 5:00pm Monday through Friday. After 5:00pm, on weekends, or holidays, please leave a message with our answering service and the doctor on-call will get back to you shortly. DISCHARGE SUMMARY from Nurse    PATIENT INSTRUCTIONS:    After general anesthesia or intravenous sedation, for 24 hours or while taking prescription Narcotics:  · Limit your activities  · Do not drive and operate hazardous machinery  · Do not make important personal or business decisions  · Do  not drink alcoholic beverages  · If you have not urinated within 8 hours after discharge, please contact your surgeon on call. Report the following to your surgeon:  · Excessive pain, swelling, redness or odor of or around the surgical area  · Temperature over 100.5  · Nausea and vomiting lasting longer than 4 hours or if unable to take medications  · Any signs of decreased circulation or nerve impairment to extremity: change in color, persistent  numbness, tingling, coldness or increase pain  · Any questions    What to do at Home:  Recommended activity: Ambulate in house and No lifting, Driving, or Strenuous exercise until advised,     If you experience any of the following symptoms above, please follow up with Dr. Bushra Richards. *  Please give a list of your current medications to your Primary Care Provider. *  Please update this list whenever your medications are discontinued, doses are      changed, or new medications (including over-the-counter products) are added. *  Please carry medication information at all times in case of emergency situations. These are general instructions for a healthy lifestyle:    No smoking/ No tobacco products/ Avoid exposure to second hand smoke  Surgeon General's Warning:  Quitting smoking now greatly reduces serious risk to your health.     Obesity, smoking, and sedentary lifestyle greatly increases your risk for illness    A healthy diet, regular physical exercise & weight monitoring are important for maintaining a healthy lifestyle    You may be retaining fluid if you have a history of heart failure or if you experience any of the following symptoms:  Weight gain of 3 pounds or more overnight or 5 pounds in a week, increased swelling in our hands or feet or shortness of breath while lying flat in bed. Please call your doctor as soon as you notice any of these symptoms; do not wait until your next office visit. Recognize signs and symptoms of STROKE:    F-face looks uneven    A-arms unable to move or move unevenly    S-speech slurred or non-existent    T-time-call 911 as soon as signs and symptoms begin-DO NOT go       Back to bed or wait to see if you get better-TIME IS BRAIN. Warning Signs of HEART ATTACK     Call 911 if you have these symptoms:   Chest discomfort. Most heart attacks involve discomfort in the center of the chest that lasts more than a few minutes, or that goes away and comes back. It can feel like uncomfortable pressure, squeezing, fullness, or pain.  Discomfort in other areas of the upper body. Symptoms can include pain or discomfort in one or both arms, the back, neck, jaw, or stomach.  Shortness of breath with or without chest discomfort.  Other signs may include breaking out in a cold sweat, nausea, or lightheadedness. Don't wait more than five minutes to call 911 - MINUTES MATTER! Fast action can save your life. Calling 911 is almost always the fastest way to get lifesaving treatment. Emergency Medical Services staff can begin treatment when they arrive -- up to an hour sooner than if someone gets to the hospital by car. Patient armband removed and shredded    The discharge information has been reviewed with the patient and caregiver. The patient and caregiver verbalized understanding.   Discharge medications reviewed with the patient and caregiver and appropriate educational materials and side effects teaching were provided.   ___________________________________________________________________________________________________________________________________

## 2020-01-09 ENCOUNTER — HOSPITAL ENCOUNTER (OUTPATIENT)
Dept: PREADMISSION TESTING | Age: 75
Discharge: HOME | End: 2020-01-09
Payer: MEDICARE

## 2020-01-09 DIAGNOSIS — Z01.818 PREOPERATIVE EXAMINATION, UNSPECIFIED: ICD-10-CM

## 2020-01-09 LAB
ALBUMIN SERPL-MCNC: 3.9 G/DL (ref 3.4–5)
ALBUMIN/GLOB SERPL: 1.6 {RATIO} (ref 0.8–1.7)
ALP SERPL-CCNC: 77 U/L (ref 45–117)
ALT SERPL-CCNC: 10 U/L (ref 16–61)
ANION GAP SERPL CALC-SCNC: 4 MMOL/L (ref 3–18)
APTT PPP: 27.6 SEC (ref 23–36.4)
AST SERPL-CCNC: 28 U/L (ref 10–38)
ATRIAL RATE: 52 BPM
BILIRUB SERPL-MCNC: 0.7 MG/DL (ref 0.2–1)
BUN SERPL-MCNC: 25 MG/DL (ref 7–18)
BUN/CREAT SERPL: 26 (ref 12–20)
CALCIUM SERPL-MCNC: 8.9 MG/DL (ref 8.5–10.1)
CALCULATED P AXIS, ECG09: 50 DEGREES
CALCULATED R AXIS, ECG10: 54 DEGREES
CALCULATED T AXIS, ECG11: 54 DEGREES
CHLORIDE SERPL-SCNC: 107 MMOL/L (ref 100–111)
CO2 SERPL-SCNC: 31 MMOL/L (ref 21–32)
CREAT SERPL-MCNC: 0.98 MG/DL (ref 0.6–1.3)
DIAGNOSIS, 93000: NORMAL
ERYTHROCYTE [DISTWIDTH] IN BLOOD BY AUTOMATED COUNT: 15.1 % (ref 11.6–14.5)
GLOBULIN SER CALC-MCNC: 2.4 G/DL (ref 2–4)
GLUCOSE SERPL-MCNC: 88 MG/DL (ref 74–99)
HCT VFR BLD AUTO: 41.3 % (ref 36–48)
HGB BLD-MCNC: 13.2 G/DL (ref 13–16)
INR PPP: 1.1 (ref 0.8–1.2)
MCH RBC QN AUTO: 27.6 PG (ref 24–34)
MCHC RBC AUTO-ENTMCNC: 32 G/DL (ref 31–37)
MCV RBC AUTO: 86.2 FL (ref 74–97)
P-R INTERVAL, ECG05: 160 MS
PLATELET # BLD AUTO: 144 K/UL (ref 135–420)
PMV BLD AUTO: 10.5 FL (ref 9.2–11.8)
POTASSIUM SERPL-SCNC: 4.5 MMOL/L (ref 3.5–5.5)
PROT SERPL-MCNC: 6.3 G/DL (ref 6.4–8.2)
PROTHROMBIN TIME: 13.7 SEC (ref 11.5–15.2)
Q-T INTERVAL, ECG07: 450 MS
QRS DURATION, ECG06: 94 MS
QTC CALCULATION (BEZET), ECG08: 418 MS
RBC # BLD AUTO: 4.79 M/UL (ref 4.7–5.5)
SODIUM SERPL-SCNC: 142 MMOL/L (ref 136–145)
VENTRICULAR RATE, ECG03: 52 BPM
WBC # BLD AUTO: 5.7 K/UL (ref 4.6–13.2)

## 2020-01-09 PROCEDURE — 85027 COMPLETE CBC AUTOMATED: CPT

## 2020-01-09 PROCEDURE — 36415 COLL VENOUS BLD VENIPUNCTURE: CPT

## 2020-01-09 PROCEDURE — 93005 ELECTROCARDIOGRAM TRACING: CPT

## 2020-01-09 PROCEDURE — 80053 COMPREHEN METABOLIC PANEL: CPT

## 2020-01-09 PROCEDURE — 85610 PROTHROMBIN TIME: CPT

## 2020-01-09 PROCEDURE — 85730 THROMBOPLASTIN TIME PARTIAL: CPT

## 2020-01-10 LAB
BACTERIA SPEC CULT: NORMAL
BACTERIA SPEC CULT: NORMAL
SERVICE CMNT-IMP: NORMAL

## 2020-01-28 PROBLEM — G89.4 CHRONIC PAIN SYNDROME: Status: ACTIVE | Noted: 2020-01-28

## 2020-01-28 PROBLEM — M54.9 CHRONIC BACK PAIN: Status: ACTIVE | Noted: 2020-01-28

## 2020-01-28 PROBLEM — G89.29 CHRONIC BACK PAIN: Status: ACTIVE | Noted: 2020-01-28

## 2020-01-28 NOTE — H&P
History and Physical        Patient: Cameron Richards. Sex: male          DOA: (Not on file)         YOB: 1945      Age:  69 y.o.        LOS:  LOS: 0 days        HPI:     Cameron Boggs is a 69 y.o. male who has a history of back pain. Their pain is typically across the lower back and is chronic in nature. He denies numbness/tingling in the same distribution of their pain. He denies weakness in the bilateral lower extremity. Their pain is worse with ambulation and better at rest. He has failed conservative care including anti-inflammatories, analgesics, physical therapy and injections. Their pain affects their activities of daily living and would like to move forward with surgical intervention. Past Medical History:   Diagnosis Date    Arthritis     Cancer ([facility] [address] 1999    melanoma    Hypertension     medication on hold,  PCP is aware     Parkinson disease ([facility] [address]        Past Surgical History:   Procedure Laterality Date    HX CORNEAL TRANSPLANT Bilateral     HX LUMBAR LAMINECTOMY      HX OTHER SURGICAL  1999    Exc melanoma on back    HX TONSILLECTOMY         No family history on file. Social History     Socioeconomic History    Marital status:      Spouse name: Not on file    Number of children: Not on file    Years of education: Not on file    Highest education level: Not on file   Tobacco Use    Smoking status: Never Smoker    Smokeless tobacco: Never Used   Substance and Sexual Activity    Alcohol use: Yes     Comment: 1 -2 beers or glass wine per year    Drug use: No    Sexual activity: Never       Prior to Admission medications    Medication Sig Start Date End Date Taking? Authorizing Provider   pramipexole 2.25 mg Tb24 Take  by mouth daily. Indications: Parkinson's Disease    Provider, Historical   prednisoLONE acetate (PRED FORTE) 1 % ophthalmic suspension Administer 1 Drop to left eye daily.     Provider, Historical   prednisoLONE acetate (PRED FORTE) 1 % ophthalmic suspension Administer 1 Drop to right eye three (3) days a week. Provider, Historical   cholecalciferol (VITAMIN D3) 25 mcg (1,000 unit) cap Take  by mouth daily. Provider, Historical   magnesium oxide (MAG-OX) 400 mg tablet Take 400 mg by mouth daily. Provider, Historical   Lactobacillus acidophilus (PROBIOTIC PO) Take  by mouth daily. Provider, Historical   amLODIPine (NORVASC) 10 mg tablet Take 10 mg by mouth daily. Provider, Historical   doxycycline (MONODOX) 100 mg capsule Take 100 mg by mouth as needed. Provider, Historical   carbidopa-levodopa ER (SINEMET CR)  mg per tablet Take 1.5 Tabs by mouth four (4) times daily. Provider, Historical   FOLIC ACID/MULTIVIT-MIN/LUTEIN (CENTRUM SILVER PO) Take 1 Tab by mouth daily. Provider, Historical   ascorbic acid, vitamin C, (VITAMIN C) 500 mg tablet Take 1,000 mg by mouth daily. Provider, Historical   CALCIUM CITRATE/VITAMIN D3 (CALCIUM CITRATE + D PO) Take 2 Tabs by mouth daily. Provider, Historical   acetaminophen (TYLENOL EXTRA STRENGTH) 500 mg tablet Take 1,000 mg by mouth every six (6) hours as needed for Pain. Provider, Historical       No Known Allergies    Review of Systems  A comprehensive review of systems was negative except for that written in the History of Present Illness. Physical Exam:      There were no vitals taken for this visit. Physical Exam:  General: Alert and Oriented X 3  Lungs: Clear to ausculation bilaterally  Cardiovascular: Regular Rate and Rhythm, without murmur  Abdomen: Soft, nontender with positive bowel sounds in all four quadrants  Gential/Rectal: deferred  Musculoskeletal: The paitent has full range of motion of the lumbar spine in flexion, extension and side bending bilaterally. The patient has pain with flexion or extension. There is not pain with internal and external rotation of the bilaterally hip.   The patient is tender across the left paralumbar, right paralumbar muscles. The patient is neurologically intact in the lower extremities. There is a Negative straight leg raise. His pulses are 2+. Calves are nontender. Labs Reviewed: All lab results for the last 24 hours reviewed. Assessment/Plan     Principal Problem:    Chronic pain syndrome (1/28/2020)    Active Problems:    Chronic back pain (1/28/2020)        At this time, we discussed that we will move forward with permanent placement of a spinal cord stimulator, as she responded so well to the trial. We discussed that we would move forward with a T9 Laminotomy with placement of the paddle in the T7-T8 disc space centrally. We will place the battery in the gluteal area. The risks versus the benefits as well as the alternatives were fully explained to the patient.

## 2020-02-02 ENCOUNTER — ANESTHESIA EVENT (OUTPATIENT)
Dept: SURGERY | Age: 75
End: 2020-02-02
Payer: MEDICARE

## 2020-02-03 ENCOUNTER — APPOINTMENT (OUTPATIENT)
Dept: GENERAL RADIOLOGY | Age: 75
End: 2020-02-03
Attending: ORTHOPAEDIC SURGERY
Payer: MEDICARE

## 2020-02-03 ENCOUNTER — HOSPITAL ENCOUNTER (OUTPATIENT)
Age: 75
Setting detail: OUTPATIENT SURGERY
Discharge: HOME | End: 2020-02-03
Attending: ORTHOPAEDIC SURGERY | Admitting: ORTHOPAEDIC SURGERY
Payer: MEDICARE

## 2020-02-03 ENCOUNTER — ANESTHESIA (OUTPATIENT)
Dept: SURGERY | Age: 75
End: 2020-02-03
Payer: MEDICARE

## 2020-02-03 VITALS
HEART RATE: 88 BPM | DIASTOLIC BLOOD PRESSURE: 70 MMHG | BODY MASS INDEX: 21.39 KG/M2 | OXYGEN SATURATION: 98 % | HEIGHT: 69 IN | TEMPERATURE: 97.8 F | RESPIRATION RATE: 16 BRPM | WEIGHT: 144.44 LBS | SYSTOLIC BLOOD PRESSURE: 131 MMHG

## 2020-02-03 DIAGNOSIS — G89.4 CHRONIC PAIN SYNDROME: Primary | ICD-10-CM

## 2020-02-03 PROCEDURE — 77030020407 HC IV BLD WRMR ST 3M -A: Performed by: SPECIALIST

## 2020-02-03 PROCEDURE — 77030008477 HC STYL SATN SLP COVD -A: Performed by: SPECIALIST

## 2020-02-03 PROCEDURE — 77030018836 HC SOL IRR NACL ICUM -A: Performed by: ORTHOPAEDIC SURGERY

## 2020-02-03 PROCEDURE — 74011250636 HC RX REV CODE- 250/636: Performed by: ORTHOPAEDIC SURGERY

## 2020-02-03 PROCEDURE — 74011250637 HC RX REV CODE- 250/637: Performed by: NURSE PRACTITIONER

## 2020-02-03 PROCEDURE — 74011250636 HC RX REV CODE- 250/636: Performed by: SPECIALIST

## 2020-02-03 PROCEDURE — 77030020782 HC GWN BAIR PAWS FLX 3M -B: Performed by: ORTHOPAEDIC SURGERY

## 2020-02-03 PROCEDURE — 77030002912 HC SUT ETHBND J&J -A: Performed by: ORTHOPAEDIC SURGERY

## 2020-02-03 PROCEDURE — 77030008683 HC TU ET CUF COVD -A: Performed by: SPECIALIST

## 2020-02-03 PROCEDURE — 74011250637 HC RX REV CODE- 250/637: Performed by: SPECIALIST

## 2020-02-03 PROCEDURE — 77030012716 HC ELEV PASS NEURO BSC -B: Performed by: ORTHOPAEDIC SURGERY

## 2020-02-03 PROCEDURE — 77030014076 HC TOOL TUNNEL BSC -C: Performed by: ORTHOPAEDIC SURGERY

## 2020-02-03 PROCEDURE — 77030019908 HC STETH ESOPH SIMS -A: Performed by: SPECIALIST

## 2020-02-03 PROCEDURE — 76210000022 HC REC RM PH II 1.5 TO 2 HR: Performed by: ORTHOPAEDIC SURGERY

## 2020-02-03 PROCEDURE — 77030010507 HC ADH SKN DERMBND J&J -B: Performed by: ORTHOPAEDIC SURGERY

## 2020-02-03 PROCEDURE — 77030034475 HC MISC IMPL SPN: Performed by: ORTHOPAEDIC SURGERY

## 2020-02-03 PROCEDURE — C1778 LEAD, NEUROSTIMULATOR: HCPCS | Performed by: ORTHOPAEDIC SURGERY

## 2020-02-03 PROCEDURE — 77030018673: Performed by: ORTHOPAEDIC SURGERY

## 2020-02-03 PROCEDURE — 77030033138 HC SUT PGA STRATFX J&J -B: Performed by: ORTHOPAEDIC SURGERY

## 2020-02-03 PROCEDURE — 77030013708 HC HNDPC SUC IRR PULS STRY –B: Performed by: ORTHOPAEDIC SURGERY

## 2020-02-03 PROCEDURE — 77030006643: Performed by: SPECIALIST

## 2020-02-03 PROCEDURE — 74011000250 HC RX REV CODE- 250: Performed by: ORTHOPAEDIC SURGERY

## 2020-02-03 PROCEDURE — 77030020268 HC MISC GENERAL SUPPLY: Performed by: ORTHOPAEDIC SURGERY

## 2020-02-03 PROCEDURE — 76210000006 HC OR PH I REC 0.5 TO 1 HR: Performed by: ORTHOPAEDIC SURGERY

## 2020-02-03 PROCEDURE — 77030014650 HC SEAL MTRX FLOSEL BAXT -C: Performed by: ORTHOPAEDIC SURGERY

## 2020-02-03 PROCEDURE — C1820 GENERATOR NEURO RECHG BAT SY: HCPCS | Performed by: ORTHOPAEDIC SURGERY

## 2020-02-03 PROCEDURE — 77030040361 HC SLV COMPR DVT MDII -B: Performed by: ORTHOPAEDIC SURGERY

## 2020-02-03 PROCEDURE — 76010000153 HC OR TIME 1.5 TO 2 HR: Performed by: ORTHOPAEDIC SURGERY

## 2020-02-03 PROCEDURE — 74011000250 HC RX REV CODE- 250: Performed by: SPECIALIST

## 2020-02-03 PROCEDURE — 76060000034 HC ANESTHESIA 1.5 TO 2 HR: Performed by: ORTHOPAEDIC SURGERY

## 2020-02-03 PROCEDURE — 77030003029 HC SUT VCRL J&J -B: Performed by: ORTHOPAEDIC SURGERY

## 2020-02-03 DEVICE — GENERATOR IPG MRI SAFE SC-1200 -- PRECISION MONTAGE: Type: IMPLANTABLE DEVICE | Site: SPINE THORACIC | Status: FUNCTIONAL

## 2020-02-03 RX ORDER — PROPOFOL 10 MG/ML
INJECTION, EMULSION INTRAVENOUS AS NEEDED
Status: DISCONTINUED | OUTPATIENT
Start: 2020-02-03 | End: 2020-02-03 | Stop reason: HOSPADM

## 2020-02-03 RX ORDER — ONDANSETRON 2 MG/ML
4 INJECTION INTRAMUSCULAR; INTRAVENOUS ONCE
Status: DISCONTINUED | OUTPATIENT
Start: 2020-02-03 | End: 2020-02-03 | Stop reason: HOSPADM

## 2020-02-03 RX ORDER — PREGABALIN 25 MG/1
25 CAPSULE ORAL ONCE
Status: COMPLETED | OUTPATIENT
Start: 2020-02-03 | End: 2020-02-03

## 2020-02-03 RX ORDER — RIVASTIGMINE 9.5 MG/24H
1 PATCH, EXTENDED RELEASE TRANSDERMAL EVERY 24 HOURS
COMMUNITY

## 2020-02-03 RX ORDER — DEXAMETHASONE SODIUM PHOSPHATE 4 MG/ML
8 INJECTION, SOLUTION INTRA-ARTICULAR; INTRALESIONAL; INTRAMUSCULAR; INTRAVENOUS; SOFT TISSUE ONCE
Status: COMPLETED | OUTPATIENT
Start: 2020-02-03 | End: 2020-02-03

## 2020-02-03 RX ORDER — OXYCODONE AND ACETAMINOPHEN 5; 325 MG/1; MG/1
1 TABLET ORAL AS NEEDED
Status: DISCONTINUED | OUTPATIENT
Start: 2020-02-03 | End: 2020-02-03 | Stop reason: HOSPADM

## 2020-02-03 RX ORDER — LIDOCAINE HYDROCHLORIDE 20 MG/ML
INJECTION, SOLUTION EPIDURAL; INFILTRATION; INTRACAUDAL; PERINEURAL AS NEEDED
Status: DISCONTINUED | OUTPATIENT
Start: 2020-02-03 | End: 2020-02-03 | Stop reason: HOSPADM

## 2020-02-03 RX ORDER — ROCURONIUM BROMIDE 10 MG/ML
INJECTION, SOLUTION INTRAVENOUS AS NEEDED
Status: DISCONTINUED | OUTPATIENT
Start: 2020-02-03 | End: 2020-02-03 | Stop reason: HOSPADM

## 2020-02-03 RX ORDER — TRAMADOL HYDROCHLORIDE 50 MG/1
50 TABLET ORAL
Qty: 28 TAB | Refills: 0 | OUTPATIENT
Start: 2020-02-03 | End: 2020-02-03

## 2020-02-03 RX ORDER — HYDROMORPHONE HYDROCHLORIDE 2 MG/ML
0.2 INJECTION, SOLUTION INTRAMUSCULAR; INTRAVENOUS; SUBCUTANEOUS
Status: DISCONTINUED | OUTPATIENT
Start: 2020-02-03 | End: 2020-02-03 | Stop reason: HOSPADM

## 2020-02-03 RX ORDER — ONDANSETRON 2 MG/ML
INJECTION INTRAMUSCULAR; INTRAVENOUS AS NEEDED
Status: DISCONTINUED | OUTPATIENT
Start: 2020-02-03 | End: 2020-02-03 | Stop reason: HOSPADM

## 2020-02-03 RX ORDER — CIPROFLOXACIN 750 MG/1
750 TABLET, FILM COATED ORAL 2 TIMES DAILY
Qty: 10 TAB | Refills: 0 | Status: SHIPPED | OUTPATIENT
Start: 2020-02-03 | End: 2020-02-03 | Stop reason: SDUPTHER

## 2020-02-03 RX ORDER — CIPROFLOXACIN 750 MG/1
750 TABLET, FILM COATED ORAL 2 TIMES DAILY
Qty: 20 TAB | Refills: 0 | Status: SHIPPED | OUTPATIENT
Start: 2020-02-03

## 2020-02-03 RX ORDER — MIDAZOLAM HYDROCHLORIDE 1 MG/ML
INJECTION, SOLUTION INTRAMUSCULAR; INTRAVENOUS AS NEEDED
Status: DISCONTINUED | OUTPATIENT
Start: 2020-02-03 | End: 2020-02-03 | Stop reason: HOSPADM

## 2020-02-03 RX ORDER — GLYCOPYRROLATE 0.2 MG/ML
INJECTION INTRAMUSCULAR; INTRAVENOUS AS NEEDED
Status: DISCONTINUED | OUTPATIENT
Start: 2020-02-03 | End: 2020-02-03 | Stop reason: HOSPADM

## 2020-02-03 RX ORDER — CIPROFLOXACIN 750 MG/1
750 TABLET, FILM COATED ORAL 2 TIMES DAILY
Qty: 10 TAB | Refills: 0 | OUTPATIENT
Start: 2020-02-03 | End: 2020-02-03 | Stop reason: SDUPTHER

## 2020-02-03 RX ORDER — ACETAMINOPHEN 500 MG
1000 TABLET ORAL ONCE
Status: COMPLETED | OUTPATIENT
Start: 2020-02-03 | End: 2020-02-03

## 2020-02-03 RX ORDER — DEXTROSE MONOHYDRATE 100 MG/ML
125-250 INJECTION, SOLUTION INTRAVENOUS AS NEEDED
Status: DISCONTINUED | OUTPATIENT
Start: 2020-02-03 | End: 2020-02-03 | Stop reason: HOSPADM

## 2020-02-03 RX ORDER — SODIUM CHLORIDE 9 MG/ML
125 INJECTION, SOLUTION INTRAVENOUS CONTINUOUS
Status: DISCONTINUED | OUTPATIENT
Start: 2020-02-03 | End: 2020-02-03 | Stop reason: HOSPADM

## 2020-02-03 RX ORDER — CEFAZOLIN SODIUM 2 G/50ML
2 SOLUTION INTRAVENOUS ONCE
Status: COMPLETED | OUTPATIENT
Start: 2020-02-03 | End: 2020-02-03

## 2020-02-03 RX ORDER — SODIUM CHLORIDE, SODIUM LACTATE, POTASSIUM CHLORIDE, CALCIUM CHLORIDE 600; 310; 30; 20 MG/100ML; MG/100ML; MG/100ML; MG/100ML
125 INJECTION, SOLUTION INTRAVENOUS CONTINUOUS
Status: DISCONTINUED | OUTPATIENT
Start: 2020-02-03 | End: 2020-02-03 | Stop reason: HOSPADM

## 2020-02-03 RX ORDER — FENTANYL CITRATE 50 UG/ML
INJECTION, SOLUTION INTRAMUSCULAR; INTRAVENOUS AS NEEDED
Status: DISCONTINUED | OUTPATIENT
Start: 2020-02-03 | End: 2020-02-03 | Stop reason: HOSPADM

## 2020-02-03 RX ORDER — TRAMADOL HYDROCHLORIDE 50 MG/1
50 TABLET ORAL
Status: DISCONTINUED | OUTPATIENT
Start: 2020-02-03 | End: 2020-02-03 | Stop reason: HOSPADM

## 2020-02-03 RX ORDER — MAGNESIUM SULFATE 100 %
4 CRYSTALS MISCELLANEOUS AS NEEDED
Status: DISCONTINUED | OUTPATIENT
Start: 2020-02-03 | End: 2020-02-03 | Stop reason: HOSPADM

## 2020-02-03 RX ORDER — ACETAMINOPHEN 10 MG/ML
1000 INJECTION, SOLUTION INTRAVENOUS
Status: DISCONTINUED | OUTPATIENT
Start: 2020-02-03 | End: 2020-02-03 | Stop reason: HOSPADM

## 2020-02-03 RX ORDER — FENTANYL CITRATE 50 UG/ML
50 INJECTION, SOLUTION INTRAMUSCULAR; INTRAVENOUS
Status: DISCONTINUED | OUTPATIENT
Start: 2020-02-03 | End: 2020-02-03 | Stop reason: HOSPADM

## 2020-02-03 RX ORDER — FENTANYL CITRATE 50 UG/ML
25 INJECTION, SOLUTION INTRAMUSCULAR; INTRAVENOUS AS NEEDED
Status: DISCONTINUED | OUTPATIENT
Start: 2020-02-03 | End: 2020-02-03 | Stop reason: HOSPADM

## 2020-02-03 RX ORDER — TRAMADOL HYDROCHLORIDE 50 MG/1
50 TABLET ORAL
Qty: 28 TAB | Refills: 0 | Status: SHIPPED | OUTPATIENT
Start: 2020-02-03 | End: 2020-02-10

## 2020-02-03 RX ORDER — NALOXONE HYDROCHLORIDE 0.4 MG/ML
0.1 INJECTION, SOLUTION INTRAMUSCULAR; INTRAVENOUS; SUBCUTANEOUS AS NEEDED
Status: DISCONTINUED | OUTPATIENT
Start: 2020-02-03 | End: 2020-02-03 | Stop reason: HOSPADM

## 2020-02-03 RX ADMIN — SODIUM CHLORIDE, SODIUM LACTATE, POTASSIUM CHLORIDE, AND CALCIUM CHLORIDE 125 ML/HR: 600; 310; 30; 20 INJECTION, SOLUTION INTRAVENOUS at 07:03

## 2020-02-03 RX ADMIN — Medication 30 MG: at 07:35

## 2020-02-03 RX ADMIN — FENTANYL CITRATE 50 MCG: 50 INJECTION, SOLUTION INTRAMUSCULAR; INTRAVENOUS at 07:35

## 2020-02-03 RX ADMIN — LIDOCAINE HYDROCHLORIDE 100 MG: 20 INJECTION, SOLUTION EPIDURAL; INFILTRATION; INTRACAUDAL; PERINEURAL at 07:35

## 2020-02-03 RX ADMIN — SODIUM CHLORIDE, SODIUM LACTATE, POTASSIUM CHLORIDE, AND CALCIUM CHLORIDE 500 ML: 600; 310; 30; 20 INJECTION, SOLUTION INTRAVENOUS at 07:03

## 2020-02-03 RX ADMIN — CEFAZOLIN SODIUM 2 G: 2 SOLUTION INTRAVENOUS at 07:50

## 2020-02-03 RX ADMIN — PROPOFOL 100 MG: 10 INJECTION, EMULSION INTRAVENOUS at 07:35

## 2020-02-03 RX ADMIN — TRAMADOL HYDROCHLORIDE 50 MG: 50 TABLET, FILM COATED ORAL at 11:20

## 2020-02-03 RX ADMIN — FENTANYL CITRATE 25 MCG: 50 INJECTION, SOLUTION INTRAMUSCULAR; INTRAVENOUS at 09:34

## 2020-02-03 RX ADMIN — ONDANSETRON HYDROCHLORIDE 4 MG: 2 INJECTION INTRAMUSCULAR; INTRAVENOUS at 07:28

## 2020-02-03 RX ADMIN — FENTANYL CITRATE 25 MCG: 50 INJECTION, SOLUTION INTRAMUSCULAR; INTRAVENOUS at 09:40

## 2020-02-03 RX ADMIN — DEXAMETHASONE SODIUM PHOSPHATE 8 MG: 4 INJECTION, SOLUTION INTRA-ARTICULAR; INTRALESIONAL; INTRAMUSCULAR; INTRAVENOUS; SOFT TISSUE at 07:00

## 2020-02-03 RX ADMIN — GLYCOPYRROLATE 0.2 MG: 0.2 INJECTION INTRAMUSCULAR; INTRAVENOUS at 07:28

## 2020-02-03 RX ADMIN — FENTANYL CITRATE 50 MCG: 50 INJECTION, SOLUTION INTRAMUSCULAR; INTRAVENOUS at 07:38

## 2020-02-03 RX ADMIN — ACETAMINOPHEN 1000 MG: 500 TABLET ORAL at 07:03

## 2020-02-03 RX ADMIN — PREGABALIN 25 MG: 25 CAPSULE ORAL at 07:03

## 2020-02-03 RX ADMIN — SODIUM CHLORIDE, SODIUM LACTATE, POTASSIUM CHLORIDE, AND CALCIUM CHLORIDE: 600; 310; 30; 20 INJECTION, SOLUTION INTRAVENOUS at 08:32

## 2020-02-03 RX ADMIN — MIDAZOLAM 2 MG: 1 INJECTION INTRAMUSCULAR; INTRAVENOUS at 07:28

## 2020-02-03 NOTE — ANESTHESIA POSTPROCEDURE EVALUATION
Procedure(s):  THORACIC-9 LAMINOTOMY WITH PLACEMENT OF SPINAL CORD STIMULATOR AND BATTERY W/C-ARM. general    Anesthesia Post Evaluation        Comments: Post-Anesthesia Evaluation and Assessment    Cardiovascular Function/Vital Signs  /89   Pulse (!) 53   Temp 36.3 °C (97.4 °F)   Resp 13   Ht 5' 9\" (1.753 m)   Wt 65.5 kg (144 lb 7 oz)   SpO2 96%   BMI 21.33 kg/m²     Patient is status post Procedure(s):  THORACIC-9 LAMINOTOMY WITH PLACEMENT OF SPINAL CORD STIMULATOR AND BATTERY W/C-ARM. Nausea/Vomiting: Controlled. Postoperative hydration reviewed and adequate. Pain:  Pain Scale 1: FLACC (02/03/20 0947)  Pain Intensity 1: 2 (02/03/20 0947)   Managed. Neurological Status:   Neuro (WDL): Exceptions to WDL (02/03/20 0935)   At baseline. Mental Status and Level of Consciousness: Arousable. Pulmonary Status:   O2 Device: Room air (02/03/20 0947)   Adequate oxygenation and airway patent. Complications related to anesthesia: None    Post-anesthesia assessment completed. No concerns. Patient has met all discharge requirements. Signed By: Garrett Gabriel MD    February 3, 2020                     Vitals Value Taken Time   /89 2/3/2020  9:47 AM   Temp 36.3 °C (97.4 °F) 2/3/2020  9:09 AM   Pulse 52 2/3/2020  9:52 AM   Resp 14 2/3/2020  9:52 AM   SpO2 96 % 2/3/2020  9:52 AM   Vitals shown include unvalidated device data.

## 2020-02-03 NOTE — ANESTHESIA PREPROCEDURE EVALUATION
Anesthetic History   No history of anesthetic complications     Pertinent negatives: No PONV       Review of Systems / Medical History  Patient summary reviewed, nursing notes reviewed and pertinent labs reviewed    Pulmonary              Pertinent negatives: No COPD, asthma and sleep apnea     Neuro/Psych         Neuromuscular disease ( parkinsons, mainly affecting right arm)     Cardiovascular    Hypertension: well controlled            Pertinent negatives: No past MI and CAD  Exercise tolerance: >4 METS     GI/Hepatic/Renal  Within defined limits           Pertinent negatives: No GERD, liver disease and renal disease   Endo/Other        Arthritis  Pertinent negatives: No diabetes   Other Findings   Comments: etoh rare           Physical Exam    Airway  Mallampati: II  TM Distance: 4 - 6 cm  Neck ROM: normal range of motion   Mouth opening: Normal     Cardiovascular    Rhythm: regular  Rate: normal         Dental    Dentition: Caps/crowns     Pulmonary  Breath sounds clear to auscultation               Abdominal  GI exam deferred       Other Findings            Anesthetic Plan    ASA: 3  Anesthesia type: general          Induction: Intravenous  Anesthetic plan and risks discussed with: Patient

## 2020-02-03 NOTE — PERIOP NOTES
Reviewed PTA medication list with patient/caregiver and patient/caregiver denies any additional medications. Patient admits to having a responsible adult care for them for at least 24 hours after surgery.     Dual skin assessment completed by Cyrus Peña RN and Terri Taylor RN.

## 2020-02-03 NOTE — PERIOP NOTES
Notified Teagan CARROLL in holding that patient had a bolus going. The bag is 1000 ml, but only 500 ml is ordered.

## 2020-02-03 NOTE — DISCHARGE INSTRUCTIONS
[facility]  Dr. Javi Chase Instructions for Spinal Cord Stimulator placement    Diet:  1. Begin with liquids and light foods such as Jell-O and soups. 2. Advance as tolerated to your regular diet if not nauseated. First 24 hours:  1. Be in the care of a responsible adult. 2. Do not drive or operate machinery. 3. Do not drink alcoholic beverages. Wound Care:  1. Maintain your postoperative dressing for 72 hours then remove pad and replace with gauze. Then change dressing daily. 2.  A home health nurse will come to your house to help you with dressing changes. 2. Keep the surgical incisions dry until follow-up. Medications:  1. Strong oral narcotic pain medications have been prescribed for the first few days. Use only as directed. No pain medication is capable of taking away all the pain. Taking your pills at regular intervals will give you the best chance of having less pain. 2. If you need a refill PLEASE PLAN AHEAD. Call our office during regular hours (8-5). 3. Do not combine with alcoholic beverages. 4. Be careful as you walk, climb stairs or drive as mild dizziness is not unusual.  5. Do not take medications that have not been prescribed by your surgeon. 6. You may switch to over the counter pain medication of your choice as you become more comfortable. WHEN TO CALL YOUR SURGEON:  1. Unrelenting pain  2. Fever or Chills  3. Redness around incisions  4. Color change in foot or toes  5. Continuous drainage or bleeding from wounds (a small amount of drainage is expected)  6. Any other worrisome condition    WHEN TO CALL YOUR REGULAR DOCTOR:  1. Flare up of any of your regular medical conditions    WHEN TO CALL 911:  1. Chest Pain  2. Shortness of Breath  3. Any other acute serious condition    CALL THE OFFICE:  If you have severe pain unrelieved by the medications; If you have a fever of 101.0°F or greater;    If you notice excessive swelling, redness, or persistent drainage from the incision or IV site; The [facility] office number is [phone] from 8:00am to 5:00pm Monday through Friday. After 5:00pm, on weekends, or holidays, please leave a message with our answering service and the doctor on-call will get back to you shortly. DISCHARGE SUMMARY from Nurse    PATIENT INSTRUCTIONS:    After general anesthesia or intravenous sedation, for 24 hours or while taking prescription Narcotics:  · Limit your activities  · Do not drive and operate hazardous machinery  · Do not make important personal or business decisions  · Do  not drink alcoholic beverages  · If you have not urinated within 8 hours after discharge, please contact your surgeon on call. Report the following to your surgeon:  · Excessive pain, swelling, redness or odor of or around the surgical area  · Temperature over 100.5  · Nausea and vomiting lasting longer than 4 hours or if unable to take medications  · Any signs of decreased circulation or nerve impairment to extremity: change in color, persistent  numbness, tingling, coldness or increase pain  · Any questions    What to do at Home:  Recommended activity: Activity as tolerated and no driving for today,     If you experience any of the following symptoms as pewr above, please follow up with DR. Morley [phone]. *  Please give a list of your current medications to your Primary Care Provider. *  Please update this list whenever your medications are discontinued, doses are      changed, or new medications (including over-the-counter products) are added. *  Please carry medication information at all times in case of emergency situations. These are general instructions for a healthy lifestyle:    No smoking/ No tobacco products/ Avoid exposure to second hand smoke  Surgeon General's Warning:  Quitting smoking now greatly reduces serious risk to your health.     Obesity, smoking, and sedentary lifestyle greatly increases your risk for illness    A healthy diet, regular physical exercise & weight monitoring are important for maintaining a healthy lifestyle    You may be retaining fluid if you have a history of heart failure or if you experience any of the following symptoms:  Weight gain of 3 pounds or more overnight or 5 pounds in a week, increased swelling in our hands or feet or shortness of breath while lying flat in bed. Please call your doctor as soon as you notice any of these symptoms; do not wait until your next office visit. Patient armband removed and shredded    The discharge information has been reviewed with the patient and caregiver. The patient and caregiver verbalized understanding. Discharge medications reviewed with the patient and caregiver and appropriate educational materials and side effects teaching were provided.   ___________________________________________________________________________________________________________________________________

## 2020-02-03 NOTE — INTERVAL H&P NOTE
H&P Update:  Morena Harris. was seen and examined. History and physical has been reviewed. The patient has been examined. There have been no significant clinical changes since the completion of the originally dated History and Physical.  Patient identified by surgeon; surgical site was confirmed by patient and surgeon.

## 2020-02-03 NOTE — OP NOTES
OPERATIVE NOTE    Patient: Whitney Montaño MRN: [id number]  SSN: [id number]    YOB: 1945  Age: 69 y.o. Sex: male      Indications: This is a 69y.o. year-old male who presents with chronic back pain. He was positive for relief from a temporary spinal cord stimulator. The patient was admitted for surgery as conservative measures have failed. Date of Procedure: 2/3/2020     Preoperative Diagnosis: CHRONIC PAIN SYNDROME    Postoperative Diagnosis: Chronic pain syndrome     Procedure: Procedure(s):  THORACIC-9 LAMINOTOMY WITH PLACEMENT OF SPINAL CORD STIMULATOR AND BATTERY W/C-ARM    Surgeon: Aristeo Jones DO, and Surgical Assistant:Roz Barnett NP and Danelle Zuniga PA-C    Assistant: Juan Packer: Brina Wu RN  Physician Assistant: Abraham Horne PA-C  Radiology Technician: Doug Shields Tech-1: Geralyn Ormond  Surg Asst-1: [facility]  Nurse Practitioner: Ingrid Polk NP    Anesthesia: general    Estimated Blood Loss: 25cc    Specimens: * No specimens in log *     Drains: none    Implants: * No implants in log *    Complications: None; patient tolerated the procedure well. Procedure: The patient was greeted by anesthesia and taken to the operative suite, where the patient underwent general endotracheal anesthesia. The patient was positioned in the prone position on a standard radiolucent Maurice spine table with the Philipp frame. The thoracolumbar spine as well as theright gluteal area was sterilely prepped and draped in standard fashion. Fluoroscopy confirmed the thoracic 9 lamina. A small 1.5-inch incision was made over the posterior spinous processes. The paraspinal musculature was elevated with electrocautery and a Caruso retractor was placed. Again, fluoroscopy confirmed the appropriate level and a small laminotomy was preformed over the inferior aspect of the thoracic 9 lamina.   Once entrance into the epidural space was confirmed the AeroDron paddle was slowly slid in the posterior epidural space, centered over the Thoracic 7 - 8 vertebraes as planned preoperatively. Once this was confirmed radiographically it was sutured into place on the remaining lamina and fascia utilizing Ethibond suture. A 2 inch incision was made over the left gluteal area below the belt line. A pocket was created 1 inch in depth. The leads from the spinal cord stimulator were tunneled into the gluteal pocket and connected to the pulse generator battery. The representative for the spinal cord stimulator confirmed that the system was functional and a basic inital programming was accomplished. The pulse generator was subsequently placed into the gluteal pocket. Both incisions were irrigated with 1000ml of sterile saline with bacitracin utilizing pulse lavage. The incision over the gluteal pocket was reapproximated with 2-0 Vicryl in subcutaneous fashion, and running 3-0 Monocryl in subcuticular fashion. A final layer of Dermabolnd skin sealant was placed as well as soft sterile dressing and tape. For the thoracic laminotomy incision the fascia was reapproximated with figure of eight #1 Vicryl suture. The skin edges were reapproximated with 2-0 Vicryl in subcutaneous fashion, and the skin was closed with running 3-0 Monocryl in subcuticular fashion. Layer of Dermabond skin sealant was again placed as well as a layer of dressing and tape. My Physician Assistant was utilized as a co-surgeon for this case to include vascular retraction, suction, gluteal pocket preparation, electrode passage, irrigation and final closure of surgical incisions. This assistance was instrumental to the safety and success of this surgical case. The patient recovered from anesthesia and was transferred to the postanesthesia care unit in stable condition. Final x-rays confirmed excellent position of the spinal cord stimulator, centered over the thoracic 7 - 8 vertebrae as planned.

## 2022-03-18 PROBLEM — M54.9 CHRONIC BACK PAIN: Status: ACTIVE | Noted: 2020-01-28

## 2022-03-18 PROBLEM — G89.29 CHRONIC BACK PAIN: Status: ACTIVE | Noted: 2020-01-28

## 2022-03-19 PROBLEM — G89.4 CHRONIC PAIN SYNDROME: Status: ACTIVE | Noted: 2020-01-28

## 2022-03-19 PROBLEM — M54.10 RADICULOPATHY OF LEG: Status: ACTIVE | Noted: 2017-11-19

## 2022-03-19 PROBLEM — M48.061 LUMBAR SPINAL STENOSIS: Status: ACTIVE | Noted: 2017-11-19

## 2022-03-20 PROBLEM — M47.816 LUMBAR SPONDYLOSIS: Status: ACTIVE | Noted: 2017-11-19

## 2023-05-17 RX ORDER — ACETAMINOPHEN 500 MG
1000 TABLET ORAL EVERY 6 HOURS PRN
COMMUNITY

## 2023-05-17 RX ORDER — PRAMIPEXOLE 2.25 MG/1
TABLET, EXTENDED RELEASE ORAL DAILY
COMMUNITY

## 2023-05-17 RX ORDER — CARBIDOPA AND LEVODOPA 50; 200 MG/1; MG/1
1.5 TABLET, EXTENDED RELEASE ORAL 4 TIMES DAILY
COMMUNITY

## 2023-05-17 RX ORDER — ASCORBIC ACID 500 MG
1000 TABLET ORAL DAILY
COMMUNITY

## 2023-05-17 RX ORDER — PREDNISOLONE ACETATE 10 MG/ML
1 SUSPENSION/ DROPS OPHTHALMIC
COMMUNITY

## 2023-05-17 RX ORDER — DOXYCYCLINE 100 MG/1
100 CAPSULE ORAL PRN
COMMUNITY

## 2023-05-17 RX ORDER — RIVASTIGMINE 9.5 MG/24H
1 PATCH, EXTENDED RELEASE TRANSDERMAL EVERY 24 HOURS
COMMUNITY

## 2023-05-17 RX ORDER — MAGNESIUM OXIDE 400 MG/1
400 TABLET ORAL DAILY
COMMUNITY

## 2023-05-17 RX ORDER — CIPROFLOXACIN 750 MG/1
750 TABLET, FILM COATED ORAL 2 TIMES DAILY
COMMUNITY
Start: 2020-02-03

## (undated) DEVICE — SINGLE PORT MANIFOLD: Brand: NEPTUNE 2

## (undated) DEVICE — SUTURE VCRL + SZ 2-0 L18IN ABSRB VLT CT-2 1/2 CIR TAPERCUT VCP726D

## (undated) DEVICE — GARMENT,MEDLINE,DVT,INT,CALF,MED, GEN2: Brand: MEDLINE

## (undated) DEVICE — STERILE POLYISOPRENE POWDER-FREE SURGICAL GLOVES: Brand: PROTEXIS

## (undated) DEVICE — 5.0MM X-COARSE DIAMOND

## (undated) DEVICE — CATH IV AUTOGRD ORN 14GA 45MM -- INSYTE-N

## (undated) DEVICE — DISPOSABLE HARDY BAYONET INSULATED BIPOLAR FORCEPS: Brand: INTEGRA® JARIT®

## (undated) DEVICE — 1010 S-DRAPE TOWEL DRAPE 10/BX: Brand: STERI-DRAPE™

## (undated) DEVICE — SOL IRRIGATION INJ NACL 0.9% 500ML BTL

## (undated) DEVICE — (D)PREP SKN CHLRAPRP APPL 26ML -- CONVERT TO ITEM 371833

## (undated) DEVICE — PLUS HANDPIECE WITH SPRAY TIP: Brand: SURGILAV

## (undated) DEVICE — SOLUTION LACTATED RINGERS INJECTION USP

## (undated) DEVICE — STERILE POLYISOPRENE POWDER-FREE SURGICAL GLOVES WITH EMOLLIENT COATING: Brand: PROTEXIS

## (undated) DEVICE — SOL INJ L R 1000ML BG --

## (undated) DEVICE — DERMABOND SKIN ADH 0.7ML -- DERMABOND ADVANCED 12/BX

## (undated) DEVICE — KENDALL SCD EXPRESS SLEEVES, KNEE LENGTH, MEDIUM: Brand: KENDALL SCD

## (undated) DEVICE — 3M™ IOBAN™ 2 ANTIMICROBIAL INCISE DRAPE 6650EZ: Brand: IOBAN™ 2

## (undated) DEVICE — PACK PROCEDURE SURG LAMINECTOMY SPINE CUST

## (undated) DEVICE — SUT ETHBND 1 30IN OS4 GRN --

## (undated) DEVICE — REMOTE CONTROL KIT: Brand: FREELINK™

## (undated) DEVICE — REM POLYHESIVE ADULT PATIENT RETURN ELECTRODE: Brand: VALLEYLAB

## (undated) DEVICE — OPTIFOAM GENTLE LITE, BORDERED, 4X4: Brand: MEDLINE

## (undated) DEVICE — KIT POS W/ FOAM ARM CRADL SHEARGUARD CHST PD CVR FOR SPNL

## (undated) DEVICE — FLOSEAL HEMOSTATIC MATRIX, 5 ML: Brand: FLOSEAL

## (undated) DEVICE — SUTURE STRATAFIX SZ 3-0 L30CM NONABSORBABLE UD L19MM FS-2 SXMP2B408

## (undated) DEVICE — SUTURE VCRL + SZ 1 L18IN ABSRB UD L36MM CT-1 1/2 CIR VCP841D

## (undated) DEVICE — PROGRAMMER CHRGR PRECISION 3 -- SC-6412-3

## (undated) DEVICE — WILSON FRAME STYLE POSITIONING KITS - 	FRAME PAD SLEEVES (SET OF 2) , DRAPE PROTECTOR, BAR PROTECTOR 7" COMFORT FOAM HEADREST, LAMINECTOMY ARM CRADLES: Brand: SOULE MEDICAL

## (undated) DEVICE — SYSTEM SKIN CLSR 22CM DERMBND PRINEO

## (undated) DEVICE — Z DISCONTINUED PER MEDLINE USE 2718072 DRESSING FOAM W5XL12.5CM SIL ADH THN BORDED CNFRM LO PROF

## (undated) DEVICE — NDL SPNE QNCKE 18GX3.5IN LF --

## (undated) DEVICE — HANDPIECE SET WITH FAN SPRAY TIP: Brand: INTERPULSE

## (undated) DEVICE — ELEVATOR NEUROSTIMULATOR SPNL PASS FOR SPNL CRD STIM SYS

## (undated) DEVICE — TOOL TUNNELER STRAW LNG 35CM -- SC-4254